# Patient Record
Sex: FEMALE | Race: WHITE | Employment: FULL TIME | URBAN - METROPOLITAN AREA
[De-identification: names, ages, dates, MRNs, and addresses within clinical notes are randomized per-mention and may not be internally consistent; named-entity substitution may affect disease eponyms.]

---

## 2022-01-10 ENCOUNTER — HOSPITAL ENCOUNTER (EMERGENCY)
Age: 42
Discharge: HOME | End: 2022-01-10
Attending: EMERGENCY MEDICINE
Payer: COMMERCIAL

## 2022-01-10 ENCOUNTER — APPOINTMENT (OUTPATIENT)
Dept: CT IMAGING | Age: 42
End: 2022-01-10
Payer: COMMERCIAL

## 2022-01-10 VITALS
SYSTOLIC BLOOD PRESSURE: 121 MMHG | HEART RATE: 94 BPM | BODY MASS INDEX: 18.48 KG/M2 | TEMPERATURE: 97.6 F | WEIGHT: 115 LBS | OXYGEN SATURATION: 100 % | HEIGHT: 66 IN | RESPIRATION RATE: 14 BRPM | DIASTOLIC BLOOD PRESSURE: 47 MMHG

## 2022-01-10 DIAGNOSIS — K42.9 UMBILICAL HERNIA WITHOUT OBSTRUCTION AND WITHOUT GANGRENE: Primary | ICD-10-CM

## 2022-01-10 LAB
A/G RATIO: 1.2 (ref 1.1–2.2)
ALBUMIN SERPL-MCNC: 4.2 G/DL (ref 3.4–5)
ALP BLD-CCNC: 68 U/L (ref 40–129)
ALT SERPL-CCNC: 13 U/L (ref 10–40)
ANION GAP SERPL CALCULATED.3IONS-SCNC: 10 MMOL/L (ref 3–16)
AST SERPL-CCNC: 20 U/L (ref 15–37)
BASOPHILS ABSOLUTE: 0 K/UL (ref 0–0.2)
BASOPHILS RELATIVE PERCENT: 0.5 %
BILIRUB SERPL-MCNC: <0.2 MG/DL (ref 0–1)
BUN BLDV-MCNC: 14 MG/DL (ref 7–20)
CALCIUM SERPL-MCNC: 9.3 MG/DL (ref 8.3–10.6)
CHLORIDE BLD-SCNC: 101 MMOL/L (ref 99–110)
CO2: 27 MMOL/L (ref 21–32)
CREAT SERPL-MCNC: 0.7 MG/DL (ref 0.6–1.1)
EOSINOPHILS ABSOLUTE: 0.4 K/UL (ref 0–0.6)
EOSINOPHILS RELATIVE PERCENT: 4.6 %
GFR AFRICAN AMERICAN: >60
GFR NON-AFRICAN AMERICAN: >60
GLUCOSE BLD-MCNC: 104 MG/DL (ref 70–99)
HCG QUALITATIVE: NEGATIVE
HCT VFR BLD CALC: 35.6 % (ref 36–48)
HEMOGLOBIN: 11.7 G/DL (ref 12–16)
LYMPHOCYTES ABSOLUTE: 2.5 K/UL (ref 1–5.1)
LYMPHOCYTES RELATIVE PERCENT: 31.8 %
MCH RBC QN AUTO: 26.9 PG (ref 26–34)
MCHC RBC AUTO-ENTMCNC: 32.7 G/DL (ref 31–36)
MCV RBC AUTO: 82.2 FL (ref 80–100)
MONOCYTES ABSOLUTE: 0.3 K/UL (ref 0–1.3)
MONOCYTES RELATIVE PERCENT: 4.4 %
NEUTROPHILS ABSOLUTE: 4.5 K/UL (ref 1.7–7.7)
NEUTROPHILS RELATIVE PERCENT: 58.7 %
PDW BLD-RTO: 15.6 % (ref 12.4–15.4)
PLATELET # BLD: 274 K/UL (ref 135–450)
PMV BLD AUTO: 7.6 FL (ref 5–10.5)
POTASSIUM REFLEX MAGNESIUM: 3.8 MMOL/L (ref 3.5–5.1)
RBC # BLD: 4.34 M/UL (ref 4–5.2)
SODIUM BLD-SCNC: 138 MMOL/L (ref 136–145)
TOTAL PROTEIN: 7.8 G/DL (ref 6.4–8.2)
WBC # BLD: 7.7 K/UL (ref 4–11)

## 2022-01-10 PROCEDURE — 84703 CHORIONIC GONADOTROPIN ASSAY: CPT

## 2022-01-10 PROCEDURE — 96374 THER/PROPH/DIAG INJ IV PUSH: CPT

## 2022-01-10 PROCEDURE — 80053 COMPREHEN METABOLIC PANEL: CPT

## 2022-01-10 PROCEDURE — 6360000002 HC RX W HCPCS: Performed by: EMERGENCY MEDICINE

## 2022-01-10 PROCEDURE — 99284 EMERGENCY DEPT VISIT MOD MDM: CPT

## 2022-01-10 PROCEDURE — 6360000004 HC RX CONTRAST MEDICATION: Performed by: EMERGENCY MEDICINE

## 2022-01-10 PROCEDURE — 74177 CT ABD & PELVIS W/CONTRAST: CPT

## 2022-01-10 PROCEDURE — 85025 COMPLETE CBC W/AUTO DIFF WBC: CPT

## 2022-01-10 RX ORDER — IBUPROFEN 600 MG/1
600 TABLET ORAL 3 TIMES DAILY PRN
Qty: 30 TABLET | Refills: 0 | Status: SHIPPED | OUTPATIENT
Start: 2022-01-10 | End: 2022-01-14 | Stop reason: ALTCHOICE

## 2022-01-10 RX ORDER — KETOROLAC TROMETHAMINE 30 MG/ML
15 INJECTION, SOLUTION INTRAMUSCULAR; INTRAVENOUS ONCE
Status: COMPLETED | OUTPATIENT
Start: 2022-01-10 | End: 2022-01-10

## 2022-01-10 RX ADMIN — IOPAMIDOL 75 ML: 755 INJECTION, SOLUTION INTRAVENOUS at 16:49

## 2022-01-10 RX ADMIN — KETOROLAC TROMETHAMINE 15 MG: 30 INJECTION, SOLUTION INTRAMUSCULAR at 15:07

## 2022-01-10 ASSESSMENT — PAIN DESCRIPTION - LOCATION
LOCATION: ABDOMEN
LOCATION: ABDOMEN

## 2022-01-10 ASSESSMENT — PAIN SCALES - GENERAL
PAINLEVEL_OUTOF10: 5
PAINLEVEL_OUTOF10: 2
PAINLEVEL_OUTOF10: 2

## 2022-01-10 ASSESSMENT — PAIN DESCRIPTION - PAIN TYPE
TYPE: ACUTE PAIN
TYPE: ACUTE PAIN

## 2022-01-10 ASSESSMENT — PAIN DESCRIPTION - ORIENTATION
ORIENTATION: MID
ORIENTATION: MID

## 2022-01-11 ENCOUNTER — TELEPHONE (OUTPATIENT)
Dept: SURGERY | Age: 42
End: 2022-01-11

## 2022-01-11 NOTE — ED NOTES
Educated pt on x1 prescriptions and discharge paperwork as well as follow-up appointment. Pt verbalizes understanding of all instructions and denies questions. IV discontinued, catheter intact, minimal bleeding at IV site, 2x2 and tape applied, manual pressure held. Pt left ambulatory by self with all personal belongings, prescriptions x1, and discharge paperwork to private residence. Pt in no distress at this time.        Nii Aldana RN  01/10/22 1919

## 2022-01-11 NOTE — ED PROVIDER NOTES
discuss with provider. ALLERGIES     Patient has no known allergies. FAMILY HISTORY     History reviewed. No pertinent family history. SOCIAL HISTORY       Social History     Socioeconomic History    Marital status: Single     Spouse name: None    Number of children: None    Years of education: None    Highest education level: None   Occupational History    None   Tobacco Use    Smoking status: Current Every Day Smoker     Packs/day: 0.50     Types: Cigarettes    Smokeless tobacco: Never Used   Vaping Use    Vaping Use: Never used   Substance and Sexual Activity    Alcohol use: No    Drug use: No    Sexual activity: None   Other Topics Concern    None   Social History Narrative    None     Social Determinants of Health     Financial Resource Strain:     Difficulty of Paying Living Expenses: Not on file   Food Insecurity:     Worried About Running Out of Food in the Last Year: Not on file    Rafy of Food in the Last Year: Not on file   Transportation Needs:     Lack of Transportation (Medical): Not on file    Lack of Transportation (Non-Medical):  Not on file   Physical Activity:     Days of Exercise per Week: Not on file    Minutes of Exercise per Session: Not on file   Stress:     Feeling of Stress : Not on file   Social Connections:     Frequency of Communication with Friends and Family: Not on file    Frequency of Social Gatherings with Friends and Family: Not on file    Attends Judaism Services: Not on file    Active Member of Clubs or Organizations: Not on file    Attends Club or Organization Meetings: Not on file    Marital Status: Not on file   Intimate Partner Violence:     Fear of Current or Ex-Partner: Not on file    Emotionally Abused: Not on file    Physically Abused: Not on file    Sexually Abused: Not on file   Housing Stability:     Unable to Pay for Housing in the Last Year: Not on file    Number of [place] in the Last Year: Not on file    Unstable Housing in the Last Year: Not on file       SCREENINGS               PHYSICAL EXAM    (up to 7 for level 4, 8 or more for level 5)     ED Triage Vitals [01/10/22 1442]   BP Temp Temp Source Pulse Resp SpO2 Height Weight   (!) 151/104 97.6 °F (36.4 °C) Oral 105 18 100 % 5' 6\" (1.676 m) 115 lb (52.2 kg)       Physical Exam    General appearance:  Cooperative. No acute distress. Skin:  Warm. Dry. Eye:  Extraocular movements intact. Ears, nose, mouth and throat:  Oral mucosa moist,  Neck:  Trachea midline. Heart:  Regular rate and rhythm  Perfusion:  intact  Respiratory:  Lungs clear to auscultation bilaterally. Respirations nonlabored. Abdominal:   Overall quite soft abdomen with no rebound or guarding. There is a nickel sized round mass just superior to the umbilicus with a small amount of overlying redness that is tender to palpation and nonreducible  Neurological:  Alert and oriented x 3.   Moves all extremities spontaneously  Musculoskeletal:   Normal ROM, no deformities          Psychiatric:  Normal mood      DIAGNOSTIC RESULTS       Labs Reviewed   CBC WITH AUTO DIFFERENTIAL - Abnormal; Notable for the following components:       Result Value    Hemoglobin 11.7 (*)     Hematocrit 35.6 (*)     RDW 15.6 (*)     All other components within normal limits    Narrative:     Performed at:  [address],  [address] [facility], [address] [employer]   Phone [phone]   COMPREHENSIVE METABOLIC PANEL W/ REFLEX TO MG FOR LOW K - Abnormal; Notable for the following components:    Glucose 104 (*)     All other components within normal limits    Narrative:     Performed at:  [address],  [address] [facility] [address], [address] [employer]   Phone [phone]   HCG, SERUM, QUALITATIVE    Narrative:     Performed at:  [address],  [address] [facility], [address] [employer]   Phone [phone]       Interpretation per the Radiologist below, if obtained/available at the time of this note:    CT ABDOMEN PELVIS W IV CONTRAST Additional Contrast? None   Final Result   1. Fluid filled umbilical hernia with trace surrounding stranding. Correlate   with presentation for incarceration/strangulation. 2. Prominent loops of small bowel. Ileus versus early small bowel   obstruction. Correlate with presentation. Clinical follow-up recommended. 3. Borderline dilated pancreatic duct of uncertain etiology. MRI would   better evaluate the pancreas. 4. Nonvisualization of the appendix, precluding evaluation for appendicitis. 5. Other findings as described. RECOMMENDATIONS:   Unavailable             All other labs/imaging were within normal range or not returned as of this dictation. EMERGENCY DEPARTMENT COURSE and DIFFERENTIAL DIAGNOSIS/MDM:   Vitals:    Vitals:    01/10/22 1442 01/10/22 1653 01/10/22 1915   BP: (!) 151/104 (!) 140/90 (!) 121/47   Pulse: 105 82 94   Resp: 18 16 14   Temp: 97.6 °F (36.4 °C)     TempSrc: Oral     SpO2: 100% 100% 100%   Weight: 115 lb (52.2 kg)     Height: 5' 6\" (1.676 m)         Patient presents emergency department today for nonreducible umbilical hernia. On exam I was unsuccessful in reducing this here. Small amount of overlying redness but no warmth. No gross fluctuance. CT scan was performed and does confirm the diagnosis of an umbilical hernia containing only fluid. No obvious bowel. She may have some incarcerated fat versus simple fluid but there is no evidence of incarcerated bowel. There was question of ileus versus partial small bowel obstruction but the patient notes no other abdominal pain, nausea, vomiting and has been having normal bowel movements and passing gas normally. Concern for significant intra-abdominal pathology is quite low. She is tolerating oral intake here without difficulty.   Discussed the case with general surgery who felt that she could be discharged home with pain medications and close outpatient surgical follow-up. She will call tomorrow morning to schedule follow-up appointment will return for any worsening abdominal pain, redness or swelling. We will hold off on antibiotics at present. MDM    CONSULTS     IP CONSULT TO GENERAL SURGERY    Critical Care:   None    REASSESSMENT          PROCEDURE     Unless otherwise noted below, none     Procedures      FINAL IMPRESSION      1. Umbilical hernia without obstruction and without gangrene            DISPOSITION/PLAN   DISPOSITION Decision To Discharge 01/10/2022 07:07:26 PM        PATIENT REFERRED TO:  Jasmina Torres MD  [address] [place] [address] Dr Froylan Huggins [address] [place] [address] [id number]    Schedule an appointment as soon as possible for a visit   Call for surgery follow up      DISCHARGE MEDICATIONS:  Discharge Medication List as of 1/10/2022  7:08 PM      START taking these medications    Details   !! ibuprofen (ADVIL;MOTRIN) 600 MG tablet Take 1 tablet by mouth 3 times daily as needed for Pain, Disp-30 tablet, R-0Print       !! - Potential duplicate medications found. Please discuss with provider. Controlled Substances Monitoring:     No flowsheet data found.     (Please note that portions of this note were completed with a voice recognition program.  Efforts were made to edit the dictations but occasionally words are mis-transcribed.)    Aníbal Solorio MD (electronically signed)  Attending Emergency Physician            Marysol Krishnan MD  01/10/22 2016

## 2022-01-12 ENCOUNTER — INITIAL CONSULT (OUTPATIENT)
Dept: SURGERY | Age: 42
End: 2022-01-12
Payer: COMMERCIAL

## 2022-01-12 VITALS
SYSTOLIC BLOOD PRESSURE: 122 MMHG | DIASTOLIC BLOOD PRESSURE: 78 MMHG | HEIGHT: 66 IN | WEIGHT: 113 LBS | BODY MASS INDEX: 18.16 KG/M2

## 2022-01-12 DIAGNOSIS — Z01.818 PRE-OP TESTING: Primary | ICD-10-CM

## 2022-01-12 DIAGNOSIS — K43.6 INCARCERATED VENTRAL HERNIA: Primary | ICD-10-CM

## 2022-01-12 PROCEDURE — G8419 CALC BMI OUT NRM PARAM NOF/U: HCPCS | Performed by: SURGERY

## 2022-01-12 PROCEDURE — G8484 FLU IMMUNIZE NO ADMIN: HCPCS | Performed by: SURGERY

## 2022-01-12 PROCEDURE — G8427 DOCREV CUR MEDS BY ELIG CLIN: HCPCS | Performed by: SURGERY

## 2022-01-12 PROCEDURE — 99203 OFFICE O/P NEW LOW 30 MIN: CPT | Performed by: SURGERY

## 2022-01-12 PROCEDURE — 4004F PT TOBACCO SCREEN RCVD TLK: CPT | Performed by: SURGERY

## 2022-01-12 RX ORDER — SODIUM CHLORIDE 0.9 % (FLUSH) 0.9 %
5-40 SYRINGE (ML) INJECTION EVERY 12 HOURS SCHEDULED
Status: CANCELLED | OUTPATIENT
Start: 2022-01-12

## 2022-01-12 RX ORDER — SODIUM CHLORIDE 0.9 % (FLUSH) 0.9 %
5-40 SYRINGE (ML) INJECTION PRN
Status: CANCELLED | OUTPATIENT
Start: 2022-01-12

## 2022-01-12 RX ORDER — BUPRENORPHINE HYDROCHLORIDE AND NALOXONE HYDROCHLORIDE DIHYDRATE 8; 2 MG/1; MG/1
1 TABLET SUBLINGUAL DAILY
COMMUNITY

## 2022-01-12 RX ORDER — SODIUM CHLORIDE 9 MG/ML
25 INJECTION, SOLUTION INTRAVENOUS PRN
Status: CANCELLED | OUTPATIENT
Start: 2022-01-12

## 2022-01-14 ENCOUNTER — HOSPITAL ENCOUNTER (OUTPATIENT)
Age: 42
Discharge: HOME | End: 2022-01-14
Payer: COMMERCIAL

## 2022-01-14 PROCEDURE — U0003 INFECTIOUS AGENT DETECTION BY NUCLEIC ACID (DNA OR RNA); SEVERE ACUTE RESPIRATORY SYNDROME CORONAVIRUS 2 (SARS-COV-2) (CORONAVIRUS DISEASE [COVID-19]), AMPLIFIED PROBE TECHNIQUE, MAKING USE OF HIGH THROUGHPUT TECHNOLOGIES AS DESCRIBED BY CMS-2020-01-R: HCPCS

## 2022-01-14 PROCEDURE — U0005 INFEC AGEN DETEC AMPLI PROBE: HCPCS

## 2022-01-15 LAB — SARS-COV-2: NOT DETECTED

## 2022-01-19 NOTE — PROGRESS NOTES
[facility]      HPI:  Patient is 46y.o. year old female seen. She   reports pain in periumbilical area. It is pressure-like and sharp. It is described as severe. It was worse earlier in the week. She had to go to the ED. Other associated symptoms are bloating/abdominal distension. These symptoms have been present for  days . The pain seems to have started with an unknown event. The pain does not radiate to the back. She   admits to seeing a bulge. She has not had previous hernia repair. Past Medical History:   Diagnosis Date    Vaginal delivery        Past Surgical History:   Procedure Laterality Date    TUBAL LIGATION         Current Outpatient Medications on File Prior to Visit   Medication Sig Dispense Refill    buprenorphine-naloxone (SUBOXONE) 8-2 MG SUBL SL tablet Place 1 tablet under the tongue daily. BID       No current facility-administered medications on file prior to visit. No Known Allergies    Social History     Socioeconomic History    Marital status: Single     Spouse name: Not on file    Number of children: Not on file    Years of education: Not on file    Highest education level: Not on file   Occupational History    Not on file   Tobacco Use    Smoking status: Current Every Day Smoker     Packs/day: 0.50     Types: Cigarettes    Smokeless tobacco: Never Used   Vaping Use    Vaping Use: Never used   Substance and Sexual Activity    Alcohol use: No    Drug use: Not Currently     Types: IV    Sexual activity: Not on file   Other Topics Concern    Not on file   Social History Narrative    Not on file     Social Determinants of Health     Financial Resource Strain:     Difficulty of Paying Living Expenses: Not on file   Food Insecurity:     Worried About Running Out of Food in the Last Year: Not on file    Rafy of Food in the Last Year: Not on file   Transportation Needs:     Lack of Transportation (Medical):  Not on file    Lack of Transportation (Non-Medical): Not on file   Physical Activity:     Days of Exercise per Week: Not on file    Minutes of Exercise per Session: Not on file   Stress:     Feeling of Stress : Not on file   Social Connections:     Frequency of Communication with Friends and Family: Not on file    Frequency of Social Gatherings with Friends and Family: Not on file    Attends Taoism Services: Not on file    Active Member of [address] [place] [address] or Organizations: Not on file    Attends Club or Organization Meetings: Not on file    Marital Status: Not on file   Intimate Partner Violence:     Fear of Current or Ex-Partner: Not on file    Emotionally Abused: Not on file    Physically Abused: Not on file    Sexually Abused: Not on file   Housing Stability:     Unable to Pay for Housing in the Last Year: Not on file    Number of [place] in the Last Year: Not on file    Unstable Housing in the Last Year: Not on file       History reviewed. No pertinent family history. ROS: She reports no complaints related to the eyes, ears , nose throat or mouth. She denies weight loss. No chest pain. No SOB. No urinary complaints. No musculoskeletal complaints. No skin rashes. No neurologic deficits. No bleeding tendencies. GI complaints include periumbilical pain and bulge. Physical Exam:  Vitals:    01/12/22 1528   BP: 122/78   113#   General:  Comfortable. No distress. Eyes:  No scleral icterus  Ears:  Normal  Nose:  Normal  Mouth:  Mucous membranes moist  Respiratory: Lungs CTA. No accessory muscle use. Heart:  Regular rhythm  Abdomen:  Soft. Non distended. Tender periumbilical area. Umbilical hernia present. Musculoskeletal:  No abnormal movements. ROM extremities normal.  Skin:  No rashes. Neurologic:  No focal deficits. Psychiatric:  AAA. O x 3.    Radiographic studies:  CT with ventral hernia. Fat containing with fluid. ASSESSMENT:  1.  Incarcerated ventral hernia            PLAN:  The diagnosis and recommended procedure were explained. Questions answered. Prepare for hernia surgery.     Gary Cortez MD

## 2022-02-12 ENCOUNTER — APPOINTMENT (OUTPATIENT)
Dept: CT IMAGING | Age: 42
End: 2022-02-12
Payer: COMMERCIAL

## 2022-02-12 ENCOUNTER — HOSPITAL ENCOUNTER (EMERGENCY)
Age: 42
Discharge: HOME | End: 2022-02-12
Attending: EMERGENCY MEDICINE
Payer: COMMERCIAL

## 2022-02-12 VITALS
TEMPERATURE: 98.3 F | RESPIRATION RATE: 13 BRPM | WEIGHT: 115 LBS | SYSTOLIC BLOOD PRESSURE: 136 MMHG | BODY MASS INDEX: 19.63 KG/M2 | OXYGEN SATURATION: 100 % | HEART RATE: 90 BPM | DIASTOLIC BLOOD PRESSURE: 97 MMHG | HEIGHT: 64 IN

## 2022-02-12 DIAGNOSIS — K02.9 DENTAL DECAY: ICD-10-CM

## 2022-02-12 DIAGNOSIS — K02.9 DENTAL CARIES: ICD-10-CM

## 2022-02-12 DIAGNOSIS — K04.7 PERIAPICAL ABSCESS: Primary | ICD-10-CM

## 2022-02-12 LAB
A/G RATIO: 1.5 (ref 1.1–2.2)
ALBUMIN SERPL-MCNC: 3.9 G/DL (ref 3.4–5)
ALP BLD-CCNC: 58 U/L (ref 40–129)
ALT SERPL-CCNC: 9 U/L (ref 10–40)
ANION GAP SERPL CALCULATED.3IONS-SCNC: 8 MMOL/L (ref 3–16)
AST SERPL-CCNC: 16 U/L (ref 15–37)
BASOPHILS ABSOLUTE: 0 K/UL (ref 0–0.2)
BASOPHILS RELATIVE PERCENT: 0.3 %
BILIRUB SERPL-MCNC: 0.5 MG/DL (ref 0–1)
BUN BLDV-MCNC: 9 MG/DL (ref 7–20)
CALCIUM SERPL-MCNC: 8.6 MG/DL (ref 8.3–10.6)
CHLORIDE BLD-SCNC: 102 MMOL/L (ref 99–110)
CO2: 24 MMOL/L (ref 21–32)
CREAT SERPL-MCNC: 0.6 MG/DL (ref 0.6–1.1)
EOSINOPHILS ABSOLUTE: 0.2 K/UL (ref 0–0.6)
EOSINOPHILS RELATIVE PERCENT: 1.6 %
GFR AFRICAN AMERICAN: >60
GFR NON-AFRICAN AMERICAN: >60
GLUCOSE BLD-MCNC: 86 MG/DL (ref 70–99)
HCG QUALITATIVE: NEGATIVE
HCT VFR BLD CALC: 39.4 % (ref 36–48)
HEMOGLOBIN: 13 G/DL (ref 12–16)
LACTIC ACID: 1 MMOL/L (ref 0.4–2)
LYMPHOCYTES ABSOLUTE: 1.8 K/UL (ref 1–5.1)
LYMPHOCYTES RELATIVE PERCENT: 12.7 %
MCH RBC QN AUTO: 27.4 PG (ref 26–34)
MCHC RBC AUTO-ENTMCNC: 33 G/DL (ref 31–36)
MCV RBC AUTO: 83.1 FL (ref 80–100)
MONOCYTES ABSOLUTE: 0.7 K/UL (ref 0–1.3)
MONOCYTES RELATIVE PERCENT: 4.9 %
NEUTROPHILS ABSOLUTE: 11.4 K/UL (ref 1.7–7.7)
NEUTROPHILS RELATIVE PERCENT: 80.5 %
PDW BLD-RTO: 15.7 % (ref 12.4–15.4)
PLATELET # BLD: 258 K/UL (ref 135–450)
PMV BLD AUTO: 7.7 FL (ref 5–10.5)
POTASSIUM REFLEX MAGNESIUM: 4.3 MMOL/L (ref 3.5–5.1)
RBC # BLD: 4.75 M/UL (ref 4–5.2)
REASON FOR REJECTION: NORMAL
REJECTED TEST: NORMAL
SODIUM BLD-SCNC: 134 MMOL/L (ref 136–145)
TOTAL PROTEIN: 6.5 G/DL (ref 6.4–8.2)
WBC # BLD: 14.2 K/UL (ref 4–11)

## 2022-02-12 PROCEDURE — 87040 BLOOD CULTURE FOR BACTERIA: CPT

## 2022-02-12 PROCEDURE — 2580000003 HC RX 258: Performed by: PHYSICIAN ASSISTANT

## 2022-02-12 PROCEDURE — 85025 COMPLETE CBC W/AUTO DIFF WBC: CPT

## 2022-02-12 PROCEDURE — 96365 THER/PROPH/DIAG IV INF INIT: CPT

## 2022-02-12 PROCEDURE — 96367 TX/PROPH/DG ADDL SEQ IV INF: CPT

## 2022-02-12 PROCEDURE — 6360000002 HC RX W HCPCS: Performed by: PHYSICIAN ASSISTANT

## 2022-02-12 PROCEDURE — 80053 COMPREHEN METABOLIC PANEL: CPT

## 2022-02-12 PROCEDURE — 83605 ASSAY OF LACTIC ACID: CPT

## 2022-02-12 PROCEDURE — 84703 CHORIONIC GONADOTROPIN ASSAY: CPT

## 2022-02-12 PROCEDURE — 6360000004 HC RX CONTRAST MEDICATION: Performed by: PHYSICIAN ASSISTANT

## 2022-02-12 PROCEDURE — 70487 CT MAXILLOFACIAL W/DYE: CPT

## 2022-02-12 PROCEDURE — 99285 EMERGENCY DEPT VISIT HI MDM: CPT

## 2022-02-12 PROCEDURE — 2500000003 HC RX 250 WO HCPCS: Performed by: PHYSICIAN ASSISTANT

## 2022-02-12 PROCEDURE — 41800 DRAINAGE OF GUM LESION: CPT

## 2022-02-12 RX ORDER — 0.9 % SODIUM CHLORIDE 0.9 %
1000 INTRAVENOUS SOLUTION INTRAVENOUS ONCE
Status: COMPLETED | OUTPATIENT
Start: 2022-02-12 | End: 2022-02-12

## 2022-02-12 RX ORDER — CHLORHEXIDINE GLUCONATE 0.12 MG/ML
15 RINSE ORAL 2 TIMES DAILY
Qty: 420 ML | Refills: 0 | Status: SHIPPED | OUTPATIENT
Start: 2022-02-12 | End: 2022-02-26

## 2022-02-12 RX ADMIN — SODIUM CHLORIDE 1000 ML: 9 INJECTION, SOLUTION INTRAVENOUS at 14:34

## 2022-02-12 RX ADMIN — IOPAMIDOL 75 ML: 755 INJECTION, SOLUTION INTRAVENOUS at 15:36

## 2022-02-12 RX ADMIN — AMPICILLIN SODIUM AND SULBACTAM SODIUM 3000 MG: 2; 1 INJECTION, POWDER, FOR SOLUTION INTRAMUSCULAR; INTRAVENOUS at 14:41

## 2022-02-12 RX ADMIN — CLINDAMYCIN PHOSPHATE 600 MG: 600 INJECTION, SOLUTION INTRAVENOUS at 15:43

## 2022-02-12 ASSESSMENT — PAIN DESCRIPTION - ORIENTATION
ORIENTATION: RIGHT;POSTERIOR;INNER
ORIENTATION: RIGHT;POSTERIOR

## 2022-02-12 ASSESSMENT — PAIN DESCRIPTION - PAIN TYPE
TYPE: ACUTE PAIN

## 2022-02-12 ASSESSMENT — PAIN DESCRIPTION - ONSET
ONSET: PROGRESSIVE

## 2022-02-12 ASSESSMENT — PAIN DESCRIPTION - LOCATION
LOCATION: MOUTH

## 2022-02-12 ASSESSMENT — PAIN SCALES - GENERAL
PAINLEVEL_OUTOF10: 9
PAINLEVEL_OUTOF10: 4

## 2022-02-12 ASSESSMENT — PAIN - FUNCTIONAL ASSESSMENT: PAIN_FUNCTIONAL_ASSESSMENT: 0-10

## 2022-02-12 ASSESSMENT — ENCOUNTER SYMPTOMS
SHORTNESS OF BREATH: 0
BACK PAIN: 0
ABDOMINAL PAIN: 0
NAUSEA: 0
TROUBLE SWALLOWING: 0
FACIAL SWELLING: 1
COLOR CHANGE: 0
VOMITING: 0
EYES NEGATIVE: 1

## 2022-02-12 ASSESSMENT — PAIN DESCRIPTION - DESCRIPTORS
DESCRIPTORS: SORE;TENDER

## 2022-02-12 ASSESSMENT — PAIN DESCRIPTION - FREQUENCY
FREQUENCY: CONTINUOUS

## 2022-02-12 ASSESSMENT — PAIN DESCRIPTION - PROGRESSION
CLINICAL_PROGRESSION: RAPIDLY IMPROVING
CLINICAL_PROGRESSION: RAPIDLY IMPROVING
CLINICAL_PROGRESSION: NOT CHANGED

## 2022-02-12 NOTE — ED PROVIDER NOTES
[address] [facility]  ED  EMERGENCY DEPARTMENT ENCOUNTER        Pt Name: Allegra Logan  MRN: [id number]  Danilogfjojo 1980  Date of evaluation: 2/12/2022  Provider: Augusto Parker PA-C  PCP: No primary care provider on file. ED Attending: Long Abdullahi MD      This patient was seen by the attending provider Long Abdullahi MD    History provided by the patient     CHIEF COMPLAINT:     Chief Complaint   Patient presents with    Oral Swelling     right upper tooth abcess with facial swelling-seen at Urgent Care yesterday and started on Augmentin/IBU 800mg/mouthwash. States swelling worse       HISTORY OF PRESENT ILLNESS:      Allegra Logan is a 46 y.o. female who arrives to the ED by private vehicle. This patient is here reporting dental infection. She states 2 days ago she developed a toothache to the right upper jaw and has subsequently developed facial swelling. Patient seen in urgent care yesterday. Patient started on Augmentin and ibuprofen. She has only taken 1 days worth of these medicines and woke up this morning with increased swelling to her right face. She describes it as a \"sore and tender\" sensation. She is not having any intraoral swelling to the posterior pharynx. No trouble swallowing or breathing. Symptoms are all to the right face. She does not currently have a dentist.  She admits to poor dentition secondary to a prior history of drug abuse. No identifiable exacerbating or alleviating factors to symptoms today. Nursing Notes were reviewed     REVIEW OF SYSTEMS:     Review of Systems   Constitutional: Negative for appetite change, chills and fever. HENT: Positive for dental problem and facial swelling. Negative for trouble swallowing. Eyes: Negative. Respiratory: Negative for shortness of breath. Cardiovascular: Negative for chest pain. Gastrointestinal: Negative for abdominal pain, nausea and vomiting. Genitourinary: Negative.     Musculoskeletal: Negative for back pain, neck pain and neck stiffness. Skin: Negative for color change. Neurological: Negative for headaches. All other systems reviewed and are negative. Except as noted above in the ROS, all other systems were reviewed and negative. PAST MEDICAL HISTORY:     Past Medical History:   Diagnosis Date    Vaginal delivery          SURGICAL HISTORY:      Past Surgical History:   Procedure Laterality Date    TUBAL LIGATION           CURRENT MEDICATIONS:       Previous Medications    BUPRENORPHINE-NALOXONE (SUBOXONE) 8-2 MG SUBL SL TABLET    Place 1 tablet under the tongue daily. BID         ALLERGIES:    Patient has no known allergies. FAMILY HISTORY:     History reviewed. No pertinent family history. SOCIAL HISTORY:       Social History     Socioeconomic History    Marital status: Single     Spouse name: None    Number of children: None    Years of education: None    Highest education level: None   Occupational History    None   Tobacco Use    Smoking status: Current Every Day Smoker     Packs/day: 0.50     Types: Cigarettes    Smokeless tobacco: Never Used   Vaping Use    Vaping Use: Never used   Substance and Sexual Activity    Alcohol use: No    Drug use: Not Currently     Types: IV    Sexual activity: None   Other Topics Concern    None   Social History Narrative    None     Social Determinants of Health     Financial Resource Strain:     Difficulty of Paying Living Expenses: Not on file   Food Insecurity:     Worried About Running Out of Food in the Last Year: Not on file    Rafy of Food in the Last Year: Not on file   Transportation Needs:     Lack of Transportation (Medical): Not on file    Lack of Transportation (Non-Medical):  Not on file   Physical Activity:     Days of Exercise per Week: Not on file    Minutes of Exercise per Session: Not on file   Stress:     Feeling of Stress : Not on file   Social Connections:     Frequency of Communication with Friends and Family: Not on file    Frequency of Social Gatherings with Friends and Family: Not on file    Attends Denominational Services: Not on file    Active Member of Clubs or Organizations: Not on file    Attends Club or Organization Meetings: Not on file    Marital Status: Not on file   Intimate Partner Violence:     Fear of Current or Ex-Partner: Not on file    Emotionally Abused: Not on file    Physically Abused: Not on file    Sexually Abused: Not on file   Housing Stability:     Unable to Pay for Housing in the Last Year: Not on file    Number of [place] in the Last Year: Not on file    Unstable Housing in the Last Year: Not on file       SCREENINGS:    Mary Coma Scale  Eye Opening: Spontaneous  Best Verbal Response: Oriented  Best Motor Response: Obeys commands  Mary Coma Scale Score: 15        PHYSICAL EXAM:       ED Triage Vitals [02/12/22 1332]   BP Temp Temp src Pulse Resp SpO2 Height Weight   (!) 129/101 98.4 °F (36.9 °C) -- 115 18 100 % 5' 4\" (1.626 m) 115 lb (52.2 kg)       Physical Exam    CONSTITUTIONAL: Awake and alert. Cooperative. Well-developed. Well-nourished. Non-toxic. No acute distress. HENT: Normocephalic. Atraumatic. External ears normal, without discharge. No nasal discharge. Right facial swelling over the maxilla and including some mild puffiness around her right inferior orbital region. Tenderness to palpate. No crepitus. No drooling or trismus. Intraorally the patient is extremely poor dentition. All teeth are broken down, decayed. There is a very subtle area of fluctuance palpable over the right central incisor. Posterior pharynx clear. Mucous membranes moist.  EYES: Conjunctiva non-injected. No scleral icterus. PERRL. EOM's grossly intact. NECK: Supple. Normal ROM. No submandibular, submental swelling. No nuchal rigidity  CARDIOVASCULAR: RRR. No Murmer. Intact distal pulses. PULMONARY/CHEST WALL: Effort normal. No tachypnea.  Lungs clear to ausculation. ABDOMEN: Soft. Nondistended. No tenderness to palpate. No guarding. /ANORECTAL: Not assessed  MUSKULOSKELETAL: Normal ROM. No acute deformities. No edema. No tenderness to palpate. SKIN: Warm and dry. No rash. NEUROLOGICAL: Alert and oriented x 3. GCS 15. CN II-XII grossly intact. Strength is 5/5 in all extremities and sensation is intact. Normal gait.    PSYCHIATRIC: Normal affect        DIAGNOSTICRESULTS:     LABS:    Results for orders placed or performed during the hospital encounter of 02/12/22   CBC Auto Differential   Result Value Ref Range    WBC 14.2 (H) 4.0 - 11.0 K/uL    RBC 4.75 4.00 - 5.20 M/uL    Hemoglobin 13.0 12.0 - 16.0 g/dL    Hematocrit 39.4 36.0 - 48.0 %    MCV 83.1 80.0 - 100.0 fL    MCH 27.4 26.0 - 34.0 pg    MCHC 33.0 31.0 - 36.0 g/dL    RDW 15.7 (H) 12.4 - 15.4 %    Platelets 344 589 - 177 K/uL    MPV 7.7 5.0 - 10.5 fL    Neutrophils % 80.5 %    Lymphocytes % 12.7 %    Monocytes % 4.9 %    Eosinophils % 1.6 %    Basophils % 0.3 %    Neutrophils Absolute 11.4 (H) 1.7 - 7.7 K/uL    Lymphocytes Absolute 1.8 1.0 - 5.1 K/uL    Monocytes Absolute 0.7 0.0 - 1.3 K/uL    Eosinophils Absolute 0.2 0.0 - 0.6 K/uL    Basophils Absolute 0.0 0.0 - 0.2 K/uL   Lactic Acid, Plasma   Result Value Ref Range    Lactic Acid 1.0 0.4 - 2.0 mmol/L   HCG Qualitative, Serum   Result Value Ref Range    hCG Qual Negative Detects HCG level >10 MIU/mL   SPECIMEN REJECTION   Result Value Ref Range    Rejected Test cmpx     Reason for Rejection see below    Comprehensive Metabolic Panel w/ Reflex to MG   Result Value Ref Range    Sodium 134 (L) 136 - 145 mmol/L    Potassium reflex Magnesium 4.3 3.5 - 5.1 mmol/L    Chloride 102 99 - 110 mmol/L    CO2 24 21 - 32 mmol/L    Anion Gap 8 3 - 16    Glucose 86 70 - 99 mg/dL    BUN 9 7 - 20 mg/dL    CREATININE 0.6 0.6 - 1.1 mg/dL    GFR Non-African American >60 >60    GFR African American >60 >60    Calcium 8.6 8.3 - 10.6 mg/dL    Total Protein 6.5 6.4 - 8.2 g/dL    Albumin 3.9 3.4 - 5.0 g/dL    Albumin/Globulin Ratio 1.5 1.1 - 2.2    Total Bilirubin 0.5 0.0 - 1.0 mg/dL    Alkaline Phosphatase 58 40 - 129 U/L    ALT 9 (L) 10 - 40 U/L    AST 16 15 - 37 U/L         RADIOLOGY:  All x-ray studies areviewed/reviewed by me. Formal interpretations per the radiologist are as follows:      CT FACIAL BONES W CONTRAST    Result Date: 2/12/2022  EXAMINATION: CT OF THE FACE WITH CONTRAST 2/12/2022 TECHNIQUE: CT of the face was performed with the administration of intravenous contrast. Multiplanar reformatted images are provided for review. Dose modulation, iterative reconstruction, and/or weight based adjustment of the mA/kV was utilized to reduce the radiation dose to as low as reasonably achievable. COMPARISON: None. HISTORY: ORDERING SYSTEM PROVIDED HISTORY: right facial swelling from dental infection TECHNOLOGIST PROVIDED HISTORY: Reason for exam:->right facial swelling from dental infection Decision Support Exception - unselect if not a suspected or confirmed emergency medical condition->Emergency Medical Condition (MA) Reason for Exam: worsening right sided facial swelling since Thursday-dental infection FINDINGS: PHARYNX/LARYNX: The palatine tonsils are normal in appearance. The tongue is normal in appearance. The valleculae, epiglottis, aryepiglottic folds and pyriform sinuses appear unremarkable. SALIVARY GLANDS: The parotid and submandibular glands appear unremarkable. LYMPH NODES: Shotty bilateral upper cervical lymph nodes, likely reactive. SOFT TISSUES: There is edema in the right periorbital and right facial soft tissues. There is a 1.5 x 4.3 x 2 cm soft tissue fluid collection overlying the right maxilla. There is also a 1.4 cm fluid collection medial to the right maxilla. BRAIN/ORBITS/SINUSES: The visualized portion of the intracranial contents appear unremarkable. The visualized portion of the orbits and mastoid air cells demonstrate no acute abnormality. Scattered mucosal thickening in the paranasal sinuses. BONES:  There are multiple dental caries with areas of periapical lucency involving the mandible and maxilla. 1. Multiple dental caries with periapical lucency involving the bilateral maxilla and mandible. There is overlying soft tissue abscess medial and lateral to the right maxilla. 2. Subcutaneous edema in the right facial soft tissues. 3. Mildly enlarged upper cervical lymph nodes, likely reactive. PROCEDURES:     PROCEDURE:  INCISION & DRAINAGE  [employer] or their surrogate had an opportunity to ask questions, and the risks, benefits, and alternatives were discussed. A local anesthetic was used to completely anesthetize the abscess. I initially attempted needle aspiration with 10 mL syringe and 18-gauge needle. No purulence was obtained. Following CT, I used an 11 blade to make an incision along the right maxilla to keep the abscess open so it will continue to drain. The patient remains neurovascularly intact. There were no complications during the procedure, and the patient tolerated it well. CRITICAL CARE TIME:       Due to the immediate potential for life-threatening deterioration due to concerns for sepsis due to initial tachycardia and obvious dental/facial infection, I spent 13 minutes providing critical care. This time is excluding time spent performing procedures.       CONSULTS:  None      EMERGENCY DEPARTMENT COURSE and DIFFERENTIAL DIAGNOSIS/MDM:   Vitals:    Vitals:    02/12/22 1543 02/12/22 1558 02/12/22 1629 02/12/22 1659   BP: (!) 141/88 123/78 (!) 159/88 (!) 132/93   Pulse: 82 81 109 84   Resp: 11 12 16 13   Temp:       SpO2: 100% 100% 99% 100%   Weight:       Height:           Patient was given the following medications:  Medications   ampicillin-sulbactam (UNASYN) 3000 mg ivpb minibag (0 mg IntraVENous Stopped 2/12/22 1520)   0.9 % sodium chloride bolus (1,000 mLs IntraVENous New Bag 2/12/22 1434)   clindamycin (CLEOCIN) 600 mg in dextrose 5% 50 mL IVPB (0 mg IntraVENous Stopped 2/12/22 1611)   iopamidol (ISOVUE-370) 76 % injection 75 mL (75 mLs IntraVENous Given 2/12/22 1536)         Patient was evaluated by both myself and Prem Madison MD.   Old records were reviewed. This patient arrives to the emergency department with a suspected dental infection. He has very poor dentition, admittedly. She was started on antibiotics and NSAIDs just yesterday. She had increased swelling today and based on exam I ordered labs including blood cultures and a CT face. Patient ordered 1 L normal saline IV with IV Unasyn and clindamycin. CBC white count 14.2 with H&H 13.0 and 39.4  Lactate normal  BMP unremarkable  hCG negative  CT facial bones shows:   1. Multiple dental caries with periapical lucency involving the bilateral   maxilla and mandible.  There is overlying soft tissue abscess medial and   lateral to the right maxilla. 2. Subcutaneous edema in the right facial soft tissues. 3. Mildly enlarged upper cervical lymph nodes, likely reactive. Patient is feeling better after meds and procedure. I do not see an indication for urgent transfer for maxillofacial/oral surgery consult but do believe patient needs close dental follow-up. She will be discharged with a dental clinic list and should continue the Augmentin at home. She has ibuprofen as well and I am adding Peridex to her list of medicines. Return precautions discussed. I estimate there is LOW risk for a ANAPHYLAXIS, DEEP SPACE INFECTION (e.g., LEONARDS ANGINA OR RETROPHARYNGEAL ABSCESS), EPIGLOTTITIS, MENINGITIS, or AIRWAY COMPROMISE, thus I consider the discharge disposition reasonable. Also, there is no evidence or peritonitis, sepsis, or toxicity. Angella Pisano and I have discussed the diagnosis and risks, and we agree with discharging home to follow-up with their primary doctor.  We also discussed returning to the Emergency Department immediately if new or worsening symptoms occur. We have discussed the symptoms which are most concerning (e.g., changing or worsening pain, trouble swallowing or breathing, neck stiffness or fever) that necessitate immediate return. FINAL IMPRESSION:      1. Periapical abscess    2. Dental decay    3.  Dental caries          DISPOSITION/PLAN:   DISPOSITION Decision To Discharge      PATIENT REFERRED TO:  Continue Augmentin until it is complete            DISCHARGE MEDICATIONS:  New Prescriptions    CHLORHEXIDINE (PERIDEX) 0.12 % SOLUTION    Take 15 mLs by mouth 2 times daily for 14 days                  (Please note thatportions of this note were completed with a voice recognition program.  Efforts were made to edit the dictations, but occasionally words are mis-transcribed.)    Katerine Brice PA-C (electronicallysigned)              Rogerio Liang, [address]  02/12/22 5322

## 2022-02-12 NOTE — ED PROVIDER NOTES
I independently performed a history and physical on [employer]. All diagnostic, treatment, and disposition decisions were made by myself in conjunction with the advanced practice provider. I have participated in the medical decision making and directed the treatment plan and disposition of the patient. For further details of [facility] emergency department encounter, please see the advanced practice provider's documentation. CHIEF COMPLAINT  Chief Complaint   Patient presents with    Oral Swelling     right upper tooth abcess with facial swelling-seen at Urgent Care yesterday and started on Augmentin/IBU 800mg/mouthwash. States swelling worse       Briefly, Tiago Cherry is a 46 y.o. female  who presents to the ED complaining of swelling to the right face and right mouth started on antibiotics and is taken 1 dose of Augmentin    FOCUSED PHYSICAL EXAMINATION  BP (!) 136/97   Pulse 90   Temp 98.3 °F (36.8 °C) (Oral)   Resp 13   Ht 5' 4\" (1.626 m)   Wt 115 lb (52.2 kg)   LMP 01/12/2022   SpO2 100%   BMI 19.74 kg/m²      Focused physical examination:    General appearance:  Cooperative. No acute distress. Skin:  Warm. Dry. Erythema and edema to the right upper jaw extending up to the right eye. Ears, nose, mouth and throat:  Oral mucosa moist, very poor dentition with heavily diseased teeth. Area edema erythema and fluctuance in the right upper canine space. No trismus  Perfusion:  intact  Respiratory: Respirations nonlabored. Neurological:  Alert. Moves all extremities spontaneously  Musculoskeletal:   Normal ROM, no deformities          Psychiatric:  Normal mood      MDM: Patient presents emergency department today with a dental infection. CT scan shows small abscess. Incision and drainage performed by midlevel provider with minimal output. Patient given IV antibiotics here. Feels markedly improved. Nontoxic. Has not failed outpatient treatment as of yet.   Given strict return precautions but will continue to trial outpatient management. Does not appear systemically ill    During the patient's ED course, the patient was given:  Medications   0.9 % sodium chloride bolus (0 mLs IntraVENous Stopped 2/12/22 1715)   clindamycin (CLEOCIN) 600 mg in dextrose 5% 50 mL IVPB (0 mg IntraVENous Stopped 2/12/22 1611)   iopamidol (ISOVUE-370) 76 % injection 75 mL (75 mLs IntraVENous Given 2/12/22 1536)        CLINICAL IMPRESSION  1. Periapical abscess    2. Dental decay    3. Dental caries        DISPOSITION  Home      This chart was created using Dragon dictation software. Efforts were made by me to ensure accuracy, however some errors may be present due to limitations of this technology.             Gianfranco Corbin MD  02/12/22 8802

## 2022-02-16 LAB
BLOOD CULTURE, ROUTINE: NORMAL
CULTURE, BLOOD 2: NORMAL